# Patient Record
Sex: FEMALE | Race: WHITE | NOT HISPANIC OR LATINO | Employment: OTHER | URBAN - METROPOLITAN AREA
[De-identification: names, ages, dates, MRNs, and addresses within clinical notes are randomized per-mention and may not be internally consistent; named-entity substitution may affect disease eponyms.]

---

## 2018-09-19 ENCOUNTER — OFFICE VISIT (OUTPATIENT)
Dept: NEUROLOGY | Facility: CLINIC | Age: 28
End: 2018-09-19

## 2018-09-19 VITALS
SYSTOLIC BLOOD PRESSURE: 118 MMHG | HEART RATE: 97 BPM | DIASTOLIC BLOOD PRESSURE: 64 MMHG | OXYGEN SATURATION: 98 % | BODY MASS INDEX: 21.53 KG/M2 | WEIGHT: 134 LBS | HEIGHT: 66 IN

## 2018-09-19 DIAGNOSIS — G43.701 CHRONIC MIGRAINE WITHOUT AURA WITH STATUS MIGRAINOSUS, NOT INTRACTABLE: Primary | ICD-10-CM

## 2018-09-19 PROCEDURE — 99204 OFFICE O/P NEW MOD 45 MIN: CPT | Performed by: PSYCHIATRY & NEUROLOGY

## 2018-09-19 RX ORDER — ACETAMINOPHEN 500 MG
500 TABLET ORAL EVERY 6 HOURS PRN
COMMUNITY
End: 2020-11-10

## 2018-09-19 RX ORDER — BACLOFEN 10 MG/1
10 TABLET ORAL DAILY
COMMUNITY
Start: 2018-09-04 | End: 2021-04-30

## 2018-09-19 RX ORDER — TOPIRAMATE 50 MG/1
50 TABLET, FILM COATED ORAL 2 TIMES DAILY
Qty: 60 TABLET | Refills: 5 | Status: SHIPPED | OUTPATIENT
Start: 2018-09-19 | End: 2018-09-27

## 2018-09-19 RX ORDER — TOPIRAMATE 25 MG/1
TABLET ORAL
Qty: 42 TABLET | Refills: 0 | Status: SHIPPED | OUTPATIENT
Start: 2018-09-19 | End: 2018-09-27

## 2018-09-19 RX ORDER — DEXAMETHASONE 1.5 MG/1
1.5 TABLET ORAL 2 TIMES DAILY WITH MEALS
Qty: 4 TABLET | Refills: 0 | Status: SHIPPED | OUTPATIENT
Start: 2018-09-19 | End: 2018-09-21

## 2018-09-19 RX ORDER — DEXAMETHASONE 1 MG
0.5 TABLET ORAL 2 TIMES DAILY WITH MEALS
Qty: 2 TABLET | Refills: 0 | Status: SHIPPED | OUTPATIENT
Start: 2018-09-19 | End: 2018-09-21
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
## 2018-09-25 ENCOUNTER — HOSPITAL ENCOUNTER (OUTPATIENT)
Dept: MRI IMAGING | Facility: HOSPITAL | Age: 28
Discharge: HOME | End: 2018-09-25
Attending: PSYCHIATRY & NEUROLOGY | Admitting: PSYCHIATRY & NEUROLOGY

## 2018-09-25 DIAGNOSIS — G43.701 CHRONIC MIGRAINE WITHOUT AURA WITH STATUS MIGRAINOSUS, NOT INTRACTABLE: ICD-10-CM

## 2018-09-25 PROCEDURE — 0 GADOBENATE DIMEGLUMINE 529 MG/ML SOLUTION: Performed by: PSYCHIATRY & NEUROLOGY

## 2018-09-25 PROCEDURE — 70553 MRI BRAIN STEM W/O & W/DYE: CPT

## 2018-09-25 PROCEDURE — A9577 INJ MULTIHANCE: HCPCS | Performed by: PSYCHIATRY & NEUROLOGY

## 2018-09-25 RX ADMIN — GADOBENATE DIMEGLUMINE 12 ML: 529 INJECTION, SOLUTION INTRAVENOUS at 11:31

## 2018-09-26 ENCOUNTER — OFFICE VISIT (OUTPATIENT)
Dept: NEUROLOGY | Facility: CLINIC | Age: 28
End: 2018-09-26

## 2018-09-26 VITALS
HEART RATE: 84 BPM | WEIGHT: 134 LBS | BODY MASS INDEX: 21.53 KG/M2 | DIASTOLIC BLOOD PRESSURE: 68 MMHG | HEIGHT: 66 IN | SYSTOLIC BLOOD PRESSURE: 116 MMHG | OXYGEN SATURATION: 99 %

## 2018-09-26 DIAGNOSIS — G43.001 MIGRAINE WITHOUT AURA AND WITH STATUS MIGRAINOSUS, NOT INTRACTABLE: Primary | ICD-10-CM

## 2018-09-26 PROCEDURE — 64450 NJX AA&/STRD OTHER PN/BRANCH: CPT | Performed by: PSYCHIATRY & NEUROLOGY

## 2018-09-26 PROCEDURE — 99212 OFFICE O/P EST SF 10 MIN: CPT | Performed by: PSYCHIATRY & NEUROLOGY

## 2018-09-26 PROCEDURE — 64405 NJX AA&/STRD GR OCPL NRV: CPT | Performed by: PSYCHIATRY & NEUROLOGY

## 2018-09-26 RX ORDER — BUPIVACAINE HYDROCHLORIDE 5 MG/ML
10 INJECTION, SOLUTION PERINEURAL ONCE
Status: COMPLETED | OUTPATIENT
Start: 2018-09-26 | End: 2018-09-26

## 2018-09-26 RX ADMIN — BUPIVACAINE HYDROCHLORIDE 10 ML: 5 INJECTION, SOLUTION PERINEURAL at 14:53
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
## 2018-09-27 RX ORDER — TOPIRAMATE 100 MG/1
1 CAPSULE, EXTENDED RELEASE ORAL NIGHTLY
Qty: 30 CAPSULE | Refills: 5 | Status: SHIPPED | OUTPATIENT
Start: 2018-09-27 | End: 2019-02-18 | Stop reason: SDUPTHER

## 2018-10-16 ENCOUNTER — PRIOR AUTHORIZATION (OUTPATIENT)
Dept: NEUROLOGY | Facility: CLINIC | Age: 28
End: 2018-10-16

## 2018-10-16 NOTE — TELEPHONE ENCOUNTER
PA authorized for pt's trokendi. Upon calling the pharmacy to notify, pharmacist informed me it would still cost pt 500 dollars for a 30 day supply and 800 for a 90 day supply. I have faxed over a Trokendi coupon savings card for pt. RxBIN:[id number]         RxPCN:[employer]         RxGRP:[id number]         ID:[id number]  Fax was successful. Pt should have $0 copay.

## 2019-02-18 ENCOUNTER — TELEPHONE (OUTPATIENT)
Dept: NEUROLOGY | Facility: CLINIC | Age: 29
End: 2019-02-18

## 2019-02-18 RX ORDER — TOPIRAMATE 100 MG/1
1 CAPSULE, EXTENDED RELEASE ORAL NIGHTLY
Qty: 30 CAPSULE | Refills: 5 | Status: SHIPPED | OUTPATIENT
Start: 2019-02-18 | End: 2020-07-07

## 2019-02-19 ENCOUNTER — TELEPHONE (OUTPATIENT)
Dept: NEUROLOGY | Facility: CLINIC | Age: 29
End: 2019-02-19

## 2019-02-19 NOTE — TELEPHONE ENCOUNTER
Patient called and advised that she need brand name of Trokendi XR sent to pharmacy so that saving card will cover medicine.

## 2019-04-19 ENCOUNTER — TELEPHONE (OUTPATIENT)
Dept: NEUROLOGY | Facility: CLINIC | Age: 29
End: 2019-04-19

## 2019-04-19 NOTE — TELEPHONE ENCOUNTER
Patient called and advised that she wanted to see if you can review her MRi again as she went to  yesterday and they advised that they saw a possible Garett- malformation and she wanted to see if you see that as patient Aunt has this condition

## 2019-04-23 NOTE — TELEPHONE ENCOUNTER
Informed patient of Dr Abhijeet caceres and she stated that she understood and has no questions at this time

## 2019-04-23 NOTE — TELEPHONE ENCOUNTER
I reviewed her MRI again.  On 1 of her images I do see something however when I review  the other images I do not see it hence I think it may be an artifact.

## 2019-04-25 ENCOUNTER — OFFICE VISIT (OUTPATIENT)
Dept: NEUROLOGY | Facility: CLINIC | Age: 29
End: 2019-04-25

## 2019-04-25 VITALS
WEIGHT: 134 LBS | HEART RATE: 79 BPM | HEIGHT: 66 IN | DIASTOLIC BLOOD PRESSURE: 66 MMHG | OXYGEN SATURATION: 99 % | BODY MASS INDEX: 21.53 KG/M2 | SYSTOLIC BLOOD PRESSURE: 120 MMHG

## 2019-04-25 DIAGNOSIS — G43.001 MIGRAINE WITHOUT AURA AND WITH STATUS MIGRAINOSUS, NOT INTRACTABLE: Primary | ICD-10-CM

## 2019-04-25 PROCEDURE — 99215 OFFICE O/P EST HI 40 MIN: CPT | Performed by: PSYCHIATRY & NEUROLOGY

## 2019-04-25 RX ORDER — SUMATRIPTAN 50 MG/1
TABLET, FILM COATED ORAL
Qty: 8 TABLET | Refills: 3 | Status: SHIPPED | OUTPATIENT
Start: 2019-04-25 | End: 2019-10-09

## 2019-04-25 RX ORDER — PROMETHAZINE HYDROCHLORIDE 25 MG/1
25 TABLET ORAL EVERY 8 HOURS PRN
Qty: 60 TABLET | Refills: 0 | Status: SHIPPED | OUTPATIENT
Start: 2019-04-25 | End: 2021-04-30
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
## 2019-10-09 ENCOUNTER — TELEPHONE (OUTPATIENT)
Dept: NEUROLOGY | Facility: CLINIC | Age: 29
End: 2019-10-09

## 2019-10-09 RX ORDER — RIZATRIPTAN BENZOATE 10 MG/1
TABLET ORAL
Qty: 8 TABLET | Refills: 3 | Status: SHIPPED | OUTPATIENT
Start: 2019-10-09 | End: 2019-10-09 | Stop reason: SDUPTHER

## 2019-10-09 RX ORDER — RIZATRIPTAN BENZOATE 10 MG/1
TABLET ORAL
Qty: 8 TABLET | Refills: 0 | Status: SHIPPED | OUTPATIENT
Start: 2019-10-09 | End: 2019-10-22

## 2019-10-09 NOTE — TELEPHONE ENCOUNTER
Pt called and requested that something be called into pharmacy ([facility], [facility]) for her migraines. Pt stated that she is no longer taking Rx Imitrex and doesn't like taking due to causing nausea and is asking for something else to be prescribed. Please advise or send Rx order to requested pharmacy. Thanks.

## 2019-10-18 ENCOUNTER — TELEPHONE (OUTPATIENT)
Dept: NEUROLOGY | Facility: CLINIC | Age: 29
End: 2019-10-18

## 2019-10-18 NOTE — TELEPHONE ENCOUNTER
----- Message from Brittany Mohamud, [employer] sent at 10/18/2019  3:25 PM EDT -----  Contact: JOHN WILSON ()  Abhijeet    PT's , John, called/LVM states PT had a bad reaction to the last medication she was prescribed and if anything the medication made her feel worse. He's wondering if anything can be called in for relief.    Please call John back: [phone]

## 2019-10-21 NOTE — TELEPHONE ENCOUNTER
Called and spoke with patient. She stated that it was not the ajovy but it is wearing off quickly in the month.  She had a reaction to the maxalt. She stated that it made her head feel worse. And it made her nauseated. It made her feel much more worse.

## 2019-10-22 RX ORDER — ELETRIPTAN HYDROBROMIDE 40 MG/1
TABLET, FILM COATED ORAL
Qty: 8 TABLET | Refills: 3 | Status: SHIPPED | OUTPATIENT
Start: 2019-10-22 | End: 2020-07-07 | Stop reason: SDUPTHER

## 2019-10-22 NOTE — TELEPHONE ENCOUNTER
She can try a different triptan called Relpax.  I have sent the prescription in.  If she ever has a headache the Relpax needs to be taken at the onset of her headache.  She should not wait

## 2019-10-23 NOTE — TELEPHONE ENCOUNTER
Called and informed the patient.     Pt stated that she would like to have a massage on her head, neck and shoulders. She wants to know if you will write her a letter of medical necessity for it so that it will qualify for her health spending acct.  So she no longer pays out of pocket.

## 2019-10-24 NOTE — TELEPHONE ENCOUNTER
Pt called back and I informed her that, Per Dr. Jha, we do not prescribe this. Patient would need to check with her PCP. Pt acknowledges understanding.

## 2020-05-26 ENCOUNTER — TELEPHONE (OUTPATIENT)
Dept: NEUROLOGY | Facility: CLINIC | Age: 30
End: 2020-05-26

## 2020-05-26 RX ORDER — FREMANEZUMAB-VFRM 225 MG/1.5ML
INJECTION SUBCUTANEOUS
Refills: 10 | OUTPATIENT
Start: 2020-05-26

## 2020-05-26 NOTE — TELEPHONE ENCOUNTER
Spoke with patient, advised she needed appt for further refills. Scheduled patient for 07/07/20 and refilled medication to get her to that appt.

## 2020-05-26 NOTE — TELEPHONE ENCOUNTER
I had the opportunity to review the med list and/or validate the medication prescribed by the provider: YES  Medication: AJOVY  Dose: 225 MG  Sig:   How does the patient take the medication? INJECTION  How often does the patient take the medication? 1X PER MONTH  Pharmacy Type (local, mail order, specialty): LOCAL  Pharmacy Name: [email or web address]  Pharmacy Phone number or location (if available): [address] [facility] [phone]  Supply Amount (e.g, 30 or 90 days): 30 DAY    PT STATES SHE WAS DUE FOR INJECTION YESTERDAY.    LAST SEEN: 4-25-19    PLEASE CALL: [phone]; PLEASE LEAVE VM IF NO ANSWER.

## 2020-07-07 ENCOUNTER — TELEPHONE (OUTPATIENT)
Dept: NEUROLOGY | Facility: CLINIC | Age: 30
End: 2020-07-07

## 2020-07-07 ENCOUNTER — OFFICE VISIT (OUTPATIENT)
Dept: NEUROLOGY | Facility: CLINIC | Age: 30
End: 2020-07-07

## 2020-07-07 VITALS
BODY MASS INDEX: 22.02 KG/M2 | DIASTOLIC BLOOD PRESSURE: 78 MMHG | WEIGHT: 137 LBS | SYSTOLIC BLOOD PRESSURE: 120 MMHG | OXYGEN SATURATION: 99 % | HEIGHT: 66 IN | HEART RATE: 61 BPM

## 2020-07-07 DIAGNOSIS — G43.001 MIGRAINE WITHOUT AURA AND WITH STATUS MIGRAINOSUS, NOT INTRACTABLE: Primary | ICD-10-CM

## 2020-07-07 PROCEDURE — 99213 OFFICE O/P EST LOW 20 MIN: CPT | Performed by: PSYCHIATRY & NEUROLOGY

## 2020-07-07 RX ORDER — ELETRIPTAN HYDROBROMIDE 40 MG/1
TABLET, FILM COATED ORAL
Qty: 8 TABLET | Refills: 3 | Status: SHIPPED | OUTPATIENT
Start: 2020-07-07 | End: 2021-04-30

## 2020-08-12 ENCOUNTER — TELEPHONE (OUTPATIENT)
Dept: NEUROLOGY | Facility: CLINIC | Age: 30
End: 2020-08-12

## 2020-08-12 NOTE — TELEPHONE ENCOUNTER
Case number [id number] is in review for Botox approval and we will receive determination in 4-5 business days.

## 2020-08-12 NOTE — TELEPHONE ENCOUNTER
HECTOR WITH [facility] CALLING NEEDING ADDITIONAL INFORMATION FOR THE PA FOR THE BOTOX.  QUESTIONS TO BE ANSWERED:  1. THEY NEED TO KNOW DOES PT HAVE FEATURES OF MIGRAINE HEADACHE OF 8 DAYS PER MONTH.    2. KNOW IF TRIED AND FAILED 2 TYPES OF MEDICATIONS FOR MIGRAINE: IF ANTIDEPRESSANT,   ANTI ELPETIC, ANTIHYPERTENSION.    3. IF GOING TO BE USED FOR CGRP AGENT FOR MIGRAINE PROPHLAXTIC.    4 PRESCRIBED UNITS THAT WOULD BE INJECTED PER VISIT.      PLEASE CALL HER BACK WITH ANSWERS TO THESE QUESTIONS [phone]

## 2020-08-14 NOTE — TELEPHONE ENCOUNTER
I have put all this in my note stating that patient is still having 15 headache days a month and has tried several medications.  Could you please call back and let them know

## 2020-08-17 NOTE — TELEPHONE ENCOUNTER
Called [facility] back @ph# given, [phone] and spoke with Radha. There was not a reference #, but she pulled her up using member ID and address verification, however she could not locate where they had ever reached out to us about this so notified I would check with our Botox rep to see if she knows more about this and see if there is anything I can do to help here.

## 2020-08-18 ENCOUNTER — TELEPHONE (OUTPATIENT)
Dept: NEUROLOGY | Facility: CLINIC | Age: 30
End: 2020-08-18

## 2020-08-18 NOTE — TELEPHONE ENCOUNTER
----- Message from Raya Jha MD sent at 7/16/2020  4:50 PM EDT -----  Tee Quiñones,    I am stopping this patient's Ajovy as it is not working well and patient wants to go ahead and stop it.  Could you please try to get the Botox approved now.  Thanks      ----- Message -----  From: Kayla Capone CMA  Sent: 7/16/2020   3:39 PM EDT  To: Raya Jha MD    Pt is informed. She states that her Ajovy is not working as well, and she would like to stop it and try Botox with Topamax.    ----- Message -----  From: Raya Jha MD  Sent: 7/16/2020   1:34 PM EDT  To: Kayla Capone CMA    Could you please call the patient and let her know that insurance has denied her Botox.  The only way they will approve it is if she goes off Ajovy.  I do not think that she should do that however you can let me know what the patient thinks.      ----- Message -----  From: Nuria Smith MA  Sent: 7/16/2020  10:16 AM EDT  To: Raya Jha MD    I called eric and was told that you can call and do a peer to peer but the lady stated that the patient would need to stop the CGRP before they would approve Botox. The number to call is [phone]. Just let me know how I can help if you need me. Thanks  ----- Message -----  From: Raya Jha MD  Sent: 7/16/2020   9:17 AM EDT  To: Nuria Smith MA    Is there a way around this?  If the Botox works I am planning to stop the CGRP medication because it is not helping her much and patient is still having 15 headache days a month.  Also so many of my patients are on both Botox and CGRP because they have different mechanisms of action      ----- Message -----  From: Nuria Smith MA  Sent: 7/15/2020   3:37 PM EDT  To: Raya Jha MD    This patients insurance denied the botox due to her being on a CGRP. Please advise

## 2020-08-18 NOTE — TELEPHONE ENCOUNTER
Called Brianna and started a new auth with pending case number [id number] it can take up to 5 days. Faxed clinicals to [phone]

## 2020-10-26 ENCOUNTER — TELEPHONE (OUTPATIENT)
Dept: NEUROLOGY | Facility: CLINIC | Age: 30
End: 2020-10-26

## 2020-10-26 NOTE — TELEPHONE ENCOUNTER
PATIENT NEEDED REFILL SENT FOR AJOVY TO PHARMACY, SHE WAS SUPPOSED TO TAKE IT YESTERDAY BUT WE WERE CLOSED PLEASE ADVISE [facility] PLEASE ADVISE AND REFILL ONCE WE CAN THANKS SO MUCH.    PATIENT # [phone]

## 2020-10-27 NOTE — TELEPHONE ENCOUNTER
PT CALLED BACK TODAY REGARDING REQUEST FOR OBI. SHE STATES SHE NEEDS THE SHOT AS SHE LEAVES TO GO OUT OF TOWN TOMORROW. PLEASE REVIEW AND ADVISE      CALL BACK- [phone]

## 2021-01-15 ENCOUNTER — PROCEDURE VISIT (OUTPATIENT)
Dept: NEUROLOGY | Facility: CLINIC | Age: 31
End: 2021-01-15

## 2021-01-15 VITALS
OXYGEN SATURATION: 100 % | TEMPERATURE: 97.1 F | HEART RATE: 56 BPM | SYSTOLIC BLOOD PRESSURE: 108 MMHG | DIASTOLIC BLOOD PRESSURE: 78 MMHG

## 2021-01-15 DIAGNOSIS — G43.001 MIGRAINE WITHOUT AURA AND WITH STATUS MIGRAINOSUS, NOT INTRACTABLE: Primary | ICD-10-CM

## 2021-01-15 PROCEDURE — 64615 CHEMODENERV MUSC MIGRAINE: CPT | Performed by: PSYCHIATRY & NEUROLOGY

## 2021-01-15 PROCEDURE — 99211 OFF/OP EST MAY X REQ PHY/QHP: CPT | Performed by: PSYCHIATRY & NEUROLOGY

## 2021-01-15 NOTE — PROGRESS NOTES
Botox Procedure Note-    Current headache frequency: 25__ headaches days / month .     The risks and benefits were discussed with the patient. The patient was given the opportunity to ask questions. Informed consent form was signed.     Onabotulinumtoxin A was reconstituted with 0.9% normal saline. All injections sites were prepped with alcohol swab. Approximately 5 units of botox were injected into each of the following sites  as per routine migraine botox injection PREEMPT protocol:  (2 injections), procerus (1 injection), frontalis (4 injections), temporalis (8 injections), occipitalis (6 injections) cervical, upper cervical paraspinals (4 injections), and trapezius (6 injections) for a total of 31 sites.  The patient tolerated the procedure well. There were no immediate complications. The patient will follow up in approximately 12 weeks for her next injection.     Total amount of onabotulinumtoxin A injected was 155 units with 45 units wasted.     Additional notes:  Patient is here for her first Botox cycle.  Last month she has had near daily headaches.  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  She continues to take Ajovy shots but they have become less effective over time.  Tries not to take anything for abortive treatment but if her headache is too bad she takes Relpax although it does not help all the time.  I have given her samples of Ubrelvy 100 mg and Nurtec to try.  I will also give her an OB/GYN referral to start birth control pills which may help with her catamenial headaches.    Botox was patient supplied

## 2021-02-01 ENCOUNTER — OFFICE VISIT (OUTPATIENT)
Dept: OBSTETRICS AND GYNECOLOGY | Facility: CLINIC | Age: 31
End: 2021-02-01

## 2021-02-01 VITALS
SYSTOLIC BLOOD PRESSURE: 110 MMHG | BODY MASS INDEX: 21.69 KG/M2 | WEIGHT: 135 LBS | DIASTOLIC BLOOD PRESSURE: 70 MMHG | TEMPERATURE: 97.6 F | HEIGHT: 66 IN

## 2021-02-01 DIAGNOSIS — G43.001 MIGRAINE WITHOUT AURA AND WITH STATUS MIGRAINOSUS, NOT INTRACTABLE: Primary | ICD-10-CM

## 2021-02-01 DIAGNOSIS — Z00.00 ANNUAL PHYSICAL EXAM: Primary | ICD-10-CM

## 2021-02-01 PROCEDURE — 99385 PREV VISIT NEW AGE 18-39: CPT | Performed by: OBSTETRICS & GYNECOLOGY

## 2021-02-01 RX ORDER — LEVONORGESTREL AND ETHINYL ESTRADIOL 0.1-0.02MG
1 KIT ORAL DAILY
Qty: 28 TABLET | Refills: 12 | Status: SHIPPED | OUTPATIENT
Start: 2021-02-01 | End: 2021-02-22

## 2021-02-01 NOTE — TELEPHONE ENCOUNTER
Caller: SCOOBY WILSON     Relationship: SELF   Best call back number: [phone]    Medication needed:   Requested Prescriptions      No prescriptions requested or ordered in this encounter     AIMGOVY  ONCE A MONTH   When do you need the refill by: ASAP     What details did the patient provide when requesting the medication: PT STATES SHE WAS DUE THIS MED ON 01/25/2021 INSURANCE DENIED STATING THEY NEED PRE AUTH   PLEASE ADVISE     Does the patient have less than a 3 day supply:  [x] Yes  [] No    What is the patient's preferred pharmacy:    [facility]   LISTED

## 2021-02-01 NOTE — PROGRESS NOTES
GYN Annual Exam     CC - Here for annual exam.     Subjective   HPI  Chrystal Lawson is a 30 y.o. female, , who presents for annual well woman exam. Patient's last menstrual period was 01/10/2021 (approximate)..  Periods are regular and last 5-7 days.  Dysmenorrhea:mild, occurring premenstrually.  Patient reports problems with: none.  Partner Status: Marital Status: .  New Partners since last visit: no.  Desires STD Screening: no. The patient was told to see us to start birth control to help ease her migraines. She has migraines almost daily and has tried Botox without relief. She is not using birth control now. She is due for an annual.    Additional OB/GYN History   Current contraception: contraceptive methods: None  Desires to: start contraception  Last Pap :  negative  Last Completed Pap Smear       Status Date      PAP SMEAR No completions recorded        History of abnormal Pap smear: no  Family history of uterine, colon, breast, or ovarian cancer: no  Performs monthly Self-Breast Exam: yes  Exercises Regularly:no  Feelings of Anxiety or Depression: no  Tobacco Usage?: No   OB History        2    Para   2    Term   2            AB        Living   2       SAB        TAB        Ectopic        Molar        Multiple        Live Births   2                Health Maintenance   Topic Date Due   • Annual Gynecologic Pelvic and Breast Exam  1990   • ANNUAL PHYSICAL  1993   • TDAP/TD VACCINES (1 - Tdap) 2009   • HEPATITIS C SCREENING  2018   • PAP SMEAR  2018   • INFLUENZA VACCINE  2020   • Pneumococcal Vaccine 0-64  Aged Out   • MENINGOCOCCAL VACCINE  Aged Out       The additional following portions of the patient's history were reviewed and updated as appropriate: allergies, current medications, past family history, past medical history, past social history, past surgical history and problem list.    Review of Systems   Constitutional: Negative for  "activity change, appetite change, unexpected weight gain and unexpected weight loss.   Eyes: Negative for visual disturbance.   Respiratory: Negative for cough and shortness of breath.    Cardiovascular: Negative for chest pain and palpitations.   Gastrointestinal: Negative for abdominal distention, abdominal pain, nausea and vomiting.   Endocrine: Negative for cold intolerance, heat intolerance, polydipsia, polyphagia and polyuria.   Genitourinary: Negative for breast discharge, breast lump and breast pain.   Musculoskeletal: Negative for back pain.   Neurological: Positive for headache. Negative for light-headedness.   Psychiatric/Behavioral: Negative for behavioral problems.   All other systems reviewed and are negative.      I have reviewed and agree with the HPI, ROS, and historical information as entered above. Raúl Bourgeois MD    Objective   /70   Temp 97.6 °F (36.4 °C)   Ht 167.6 cm (66\")   Wt 61.2 kg (135 lb)   LMP 01/10/2021 (Approximate)   Breastfeeding No   BMI 21.79 kg/m²     Physical Exam  Vitals signs reviewed. Exam conducted with a chaperone present.   Constitutional:       Appearance: Normal appearance. She is normal weight.   HENT:      Head: Normocephalic and atraumatic.   Cardiovascular:      Rate and Rhythm: Normal rate and regular rhythm.   Pulmonary:      Effort: Pulmonary effort is normal.      Breath sounds: Normal breath sounds.   Chest:      Breasts:         Right: Normal.         Left: Normal.   Abdominal:      General: Abdomen is flat. Bowel sounds are normal.      Palpations: Abdomen is soft.   Genitourinary:     General: Normal vulva.      Vagina: Normal.      Cervix: Normal.      Uterus: Normal.       Adnexa: Right adnexa normal and left adnexa normal.      Rectum: Normal.   Skin:     General: Skin is warm and dry.   Neurological:      Mental Status: She is alert and oriented to person, place, and time.   Psychiatric:         Mood and Affect: Mood normal.         " Behavior: Behavior normal.         Assessment/Plan     Assessment     Problem List Items Addressed This Visit     None      Visit Diagnoses     Annual physical exam    -  Primary    Relevant Orders    Pap IG, HPV-hr          1. GYN annual well woman exam.   2. Migraine headaches without aura    Plan     1. OCP's/Vaginal Ring - Discussed side effects of nausea, BTB, headaches, breast tenderness and slight weight gain in the first three cycles.  Understands risks of blood clots, stroke, and theoretical risk of breast cancer.  Denies family history of blood clots.  2. Reviewed monthly self breast exams.  Instructed to call with lumps, pain, or breast discharge.    3. Reviewed exercise as a preventative health measures.   4. Reccommended Flu Vaccine in Fall of each year.  5. RTC in 1 year or PRN with problems      Raúl Bourgeois MD  02/01/2021

## 2021-02-01 NOTE — PATIENT INSTRUCTIONS
Preventive Care 21-39 Years Old, Female  Preventive care refers to visits with your health care provider and lifestyle choices that can promote health and wellness. This includes:  · A yearly physical exam. This may also be called an annual well check.  · Regular dental visits and eye exams.  · Immunizations.  · Screening for certain conditions.  · Healthy lifestyle choices, such as eating a healthy diet, getting regular exercise, not using drugs or products that contain nicotine and tobacco, and limiting alcohol use.  What can I expect for my preventive care visit?  Physical exam  Your health care provider will check your:  · Height and weight. This may be used to calculate body mass index (BMI), which tells if you are at a healthy weight.  · Heart rate and blood pressure.  · Skin for abnormal spots.  Counseling  Your health care provider may ask you questions about your:  · Alcohol, tobacco, and drug use.  · Emotional well-being.  · Home and relationship well-being.  · Sexual activity.  · Eating habits.  · Work and work environment.  · Method of birth control.  · Menstrual cycle.  · Pregnancy history.  What immunizations do I need?    Influenza (flu) vaccine  · This is recommended every year.  Tetanus, diphtheria, and pertussis (Tdap) vaccine  · You may need a Td booster every 10 years.  Varicella (chickenpox) vaccine  · You may need this if you have not been vaccinated.  Human papillomavirus (HPV) vaccine  · If recommended by your health care provider, you may need three doses over 6 months.  Measles, mumps, and rubella (MMR) vaccine  · You may need at least one dose of MMR. You may also need a second dose.  Meningococcal conjugate (MenACWY) vaccine  · One dose is recommended if you are age 19-21 years and a first-year college student living in a residence [place], or if you have one of several medical conditions. You may also need additional booster doses.  Pneumococcal conjugate (PCV13) vaccine  · You may need  this if you have certain conditions and were not previously vaccinated.  Pneumococcal polysaccharide (PPSV23) vaccine  · You may need one or two doses if you smoke cigarettes or if you have certain conditions.  Hepatitis A vaccine  · You may need this if you have certain conditions or if you travel or work in places where you may be exposed to hepatitis A.  Hepatitis B vaccine  · You may need this if you have certain conditions or if you travel or work in places where you may be exposed to hepatitis B.  Haemophilus influenzae type b (Hib) vaccine  · You may need this if you have certain conditions.  You may receive vaccines as individual doses or as more than one vaccine together in one shot (combination vaccines). Talk with your health care provider about the risks and benefits of combination vaccines.  What tests do I need?    Blood tests  · Lipid and cholesterol levels. These may be checked every 5 years starting at age 20.  · Hepatitis C test.  · Hepatitis B test.  Screening  · Diabetes screening. This is done by checking your blood sugar (glucose) after you have not eaten for a while (fasting).  · Sexually transmitted disease (STD) testing.  · BRCA-related cancer screening. This may be done if you have a family history of breast, ovarian, tubal, or peritoneal cancers.  · Pelvic exam and Pap test. This may be done every 3 years starting at age 21. Starting at age 30, this may be done every 5 years if you have a Pap test in combination with an HPV test.  Talk with your health care provider about your test results, treatment options, and if necessary, the need for more tests.  Follow these instructions at home:  Eating and drinking    · Eat a diet that includes fresh fruits and vegetables, whole grains, lean protein, and low-fat dairy.  · Take vitamin and mineral supplements as recommended by your health care provider.  · Do not drink alcohol if:  ? Your health care provider tells you not to drink.  ? You are  pregnant, may be pregnant, or are planning to become pregnant.  · If you drink alcohol:  ? Limit how much you have to 0-1 drink a day.  ? Be aware of how much alcohol is in your drink. In the [place] one drink equals one 12 oz bottle of beer (355 mL), one 5 oz glass of wine (148 mL), or one 1½ oz glass of hard liquor (44 mL).  Lifestyle  · Take daily care of your teeth and gums.  · Stay active. Exercise for at least 30 minutes on 5 or more days each week.  · Do not use any products that contain nicotine or tobacco, such as cigarettes, e-cigarettes, and chewing tobacco. If you need help quitting, ask your health care provider.  · If you are sexually active, practice safe sex. Use a condom or other form of birth control (contraception) in order to prevent pregnancy and STIs (sexually transmitted infections). If you plan to become pregnant, see your health care provider for a preconception visit.  What's next?  · Visit your health care provider once a year for a well check visit.  · Ask your health care provider how often you should have your eyes and teeth checked.  · Stay up to date on all vaccines.  This information is not intended to replace advice given to you by your health care provider. Make sure you discuss any questions you have with your health care provider.  Document Revised: 08/29/2019 Document Reviewed: 08/29/2019  [employer] Patient Education © 2020 [employer]

## 2021-02-17 DIAGNOSIS — Z00.00 ANNUAL PHYSICAL EXAM: ICD-10-CM

## 2021-02-18 ENCOUNTER — TELEPHONE (OUTPATIENT)
Dept: OBSTETRICS AND GYNECOLOGY | Facility: CLINIC | Age: 31
End: 2021-02-18

## 2021-02-22 ENCOUNTER — TELEPHONE (OUTPATIENT)
Dept: OBSTETRICS AND GYNECOLOGY | Facility: CLINIC | Age: 31
End: 2021-02-22

## 2021-02-22 ENCOUNTER — TELEPHONE (OUTPATIENT)
Dept: NEUROLOGY | Facility: CLINIC | Age: 31
End: 2021-02-22

## 2021-02-22 ENCOUNTER — DOCUMENTATION (OUTPATIENT)
Dept: NEUROLOGY | Facility: HOSPITAL | Age: 31
End: 2021-02-22

## 2021-02-22 RX ORDER — SUMATRIPTAN 6 MG/.5ML
INJECTION, SOLUTION SUBCUTANEOUS
Qty: 1 ML | Refills: 3 | Status: SHIPPED | OUTPATIENT
Start: 2021-02-22 | End: 2021-04-30

## 2021-02-22 NOTE — TELEPHONE ENCOUNTER
Provider:   Caller: SCOOBY  Relationship to Patient: SELF  Phone Number: [phone]  Pharmacy: [place] [address]    Reason for Call: PT STATES SHE WENT TO THE OBGYN LIKE SUGGESTED (NOTES IN Epic) BUT SINCE STARTING THE BIRTH CONTROL levonorgestrel-ethinyl estradiol (AVIANE,ALESSE,LESSINA) 0.1-20 MG-MCG per tablet ON 2-14-21 SHE HAS HAD A MIGRAINE EVERYDAY SINCE , SHE STATES 8/10 SCALE. SHE ALSO STATES THAT THE SAMPLES OF Ubrelvy 100 mg and Nurtec DID NOT HELP WHAT SO EVER. SHE STATES SHE HAS MISSED 3 DAYS OF WORK D/T THE CONSTANT MIGRAINE, SHE WAS WONDERING IF ANYTHING COULD BE SENT BEFORE HER AJOVY ON 2-25-21, PLEASE ADVISE.     When was the patient last seen: 1-15-21    When did it start: 2-14-21    Where is it located: HEAD, ALL OVER, NECK SHOULDERS, HER JOINTS    Characteristics of symptom/severity: ACHY,AND VERY NAUSEOUS     Timing- Is it constant or intermittent: CONSTANT    What makes it worse: LOUD NOISES, LIGHT    What makes it better: DARKNESS    What therapies/medications have you tried: NORMAL MEDICATIONS .

## 2021-02-23 ENCOUNTER — TELEPHONE (OUTPATIENT)
Dept: NEUROLOGY | Facility: CLINIC | Age: 31
End: 2021-02-23

## 2021-02-23 RX ORDER — METHYLPREDNISOLONE 4 MG/1
TABLET ORAL
Qty: 1 EACH | Refills: 0 | Status: SHIPPED | OUTPATIENT
Start: 2021-02-23 | End: 2021-04-19 | Stop reason: SDUPTHER

## 2021-02-23 NOTE — TELEPHONE ENCOUNTER
Please advise.     Patient formed instant allergic reaction to the sumatriptan injection that was prescribed.

## 2021-02-23 NOTE — TELEPHONE ENCOUNTER
Caller: PT     Relationship: SELF    Best call back number: [phone]    What medications are you currently taking:   Current Outpatient Medications on File Prior to Visit   Medication Sig Dispense Refill   • baclofen (LIORESAL) 10 MG tablet Take 10 mg by mouth Daily.     • eletriptan (RELPAX) 40 MG tablet Take 1 tablet at onset of migraine.  May repeat once in 2 hours.  Do not take over 2 tablets a day or 8 tablets a month 8 tablet 3   • Fremanezumab-vfrm 225 MG/1.5ML solution prefilled syringe Inject 1.5 mL under the skin into the appropriate area as directed Every 30 (Thirty) Days. 1.5 mL 11   • OnabotulinumtoxinA 200 units reconstituted solution Inject 200 units IM into head neck shoulders every 12 weeks per FDA protocol Dx G43.701 1 each 3   • promethazine (PHENERGAN) 25 MG tablet Take 1 tablet by mouth Every 8 (Eight) Hours As Needed for Nausea or Vomiting. 60 tablet 0     No current facility-administered medications on file prior to visit.         When did you start taking these medications: 2/22/21    Which medication are you concerned about: SUMATRIPTAN    Who prescribed you this medication:     What are your concerns: PT STATED SHE HAD AN ALLERGIC REACTION AND THAT SHE CALLED THE EMERGENCY LINE AND SPOKE WITH SOMEONE AND THEY TOLD HER THAT  WOULD SPEAK WITH HER TODAY. SHE ALSO STATED THAT SHE CAN NOT GET HER AJOVY INJECTION UNTIL THE 25TH. THE EMERGENCY DOCTOR TOLD HER NO MORE TRIPTAN MEDICATIONS. SHE STATED SHE NEEDS SOMETHING TODAY SO THAT SHE CAN GO TO WORK THIS WEEK.    How long have you been taking these medications: 1 DAY AND IT WAS INSTANT REACTION

## 2021-02-23 NOTE — TELEPHONE ENCOUNTER
Tell the patient I want her to take a steroid taper pack.  I have called in the prescription to her pharmacy.  That will help her allergic reaction as well as her headaches

## 2021-02-24 RX ORDER — ACETAMINOPHEN AND CODEINE PHOSPHATE 120; 12 MG/5ML; MG/5ML
1 SOLUTION ORAL DAILY
Qty: 28 TABLET | Refills: 12 | Status: SHIPPED | OUTPATIENT
Start: 2021-02-24 | End: 2021-05-18

## 2021-04-08 ENCOUNTER — TELEPHONE (OUTPATIENT)
Dept: NEUROLOGY | Facility: CLINIC | Age: 31
End: 2021-04-08

## 2021-04-09 NOTE — TELEPHONE ENCOUNTER
Provider:   Caller: [facility]    Reason for Call: STATES THEY DID NOT RECEIVE A INSURANCE CARD FOR PATIENT.

## 2021-04-19 ENCOUNTER — TELEPHONE (OUTPATIENT)
Dept: NEUROLOGY | Facility: CLINIC | Age: 31
End: 2021-04-19

## 2021-04-19 DIAGNOSIS — G43.001 MIGRAINE WITHOUT AURA AND WITH STATUS MIGRAINOSUS, NOT INTRACTABLE: Primary | ICD-10-CM

## 2021-04-19 RX ORDER — METHYLPREDNISOLONE 4 MG/1
TABLET ORAL
Qty: 1 EACH | Refills: 0 | Status: CANCELLED | OUTPATIENT
Start: 2021-04-19

## 2021-04-19 RX ORDER — METHYLPREDNISOLONE 4 MG/1
TABLET ORAL
Qty: 1 EACH | Refills: 0 | Status: SHIPPED | OUTPATIENT
Start: 2021-04-19 | End: 2021-04-30

## 2021-04-19 NOTE — TELEPHONE ENCOUNTER
Spoke to patient and informed her Dr. Jha sent in the Medrol Dose nirmala and reminded her on her 2nd Botox appointment.

## 2021-04-19 NOTE — TELEPHONE ENCOUNTER
Caller: Chrystal Lawson    Relationship: Self    Best call back number: [phone]  Medication needed:   Requested Prescriptions      No prescriptions requested or ordered in this encounter     methylPREDNISolone (MEDROL) 4 MG dose pack    When do you need the refill by: ASAP    What additional details did the patient provide when requesting the medication: PT STATES SHE HAS BEEN HAVING MAJOR MIGRAINES.     Does the patient have less than a 3 day supply:  [x] Yes  [] No    What is the patient's preferred pharmacy:     [facility]

## 2021-04-19 NOTE — TELEPHONE ENCOUNTER
Please let the patient know that I have sent in the Medrol dose pack.  Also let her know that she has an appointment with me at the end of the month on 30th for her second Botox

## 2021-04-22 ENCOUNTER — TELEPHONE (OUTPATIENT)
Dept: NEUROLOGY | Facility: CLINIC | Age: 31
End: 2021-04-22

## 2021-04-22 NOTE — TELEPHONE ENCOUNTER
Provider: JAVED    Caller: SCOOBY WILSON    Relationship to Patient: SELF    Pharmacy: [facility] - # [phone]    Phone Number: [phone]    Reason for Call: THE PATIENT JUST WANTED TO CALL TO UPDATE THE SPECIALTY PHARMACY FOR HER BOTOX.     PLEASE ADVISE.

## 2021-04-30 ENCOUNTER — PROCEDURE VISIT (OUTPATIENT)
Dept: NEUROLOGY | Facility: CLINIC | Age: 31
End: 2021-04-30

## 2021-04-30 VITALS
SYSTOLIC BLOOD PRESSURE: 118 MMHG | BODY MASS INDEX: 21.62 KG/M2 | WEIGHT: 134.5 LBS | TEMPERATURE: 97.5 F | DIASTOLIC BLOOD PRESSURE: 72 MMHG | OXYGEN SATURATION: 99 % | HEART RATE: 69 BPM | HEIGHT: 66 IN

## 2021-04-30 DIAGNOSIS — G43.001 MIGRAINE WITHOUT AURA AND WITH STATUS MIGRAINOSUS, NOT INTRACTABLE: Primary | ICD-10-CM

## 2021-04-30 PROCEDURE — 64615 CHEMODENERV MUSC MIGRAINE: CPT | Performed by: PSYCHIATRY & NEUROLOGY

## 2021-04-30 RX ORDER — METHYLPREDNISOLONE 4 MG/1
TABLET ORAL
Qty: 1 EACH | Refills: 0 | Status: SHIPPED | OUTPATIENT
Start: 2021-04-30 | End: 2021-07-09 | Stop reason: SDUPTHER

## 2021-04-30 NOTE — PROGRESS NOTES
Botox Procedure Note-     Current headache frequency: 10-15__ headaches days / month .     The risks and benefits were discussed with the patient. The patient was given the opportunity to ask questions. Informed consent form was signed.     Onabotulinumtoxin A was reconstituted with 0.9% normal saline. All injections sites were prepped with alcohol swab. Approximately 5 units of botox were injected into each of the following sites  as per routine migraine botox injection PREEMPT protocol:  (2 injections), procerus (1 injection), frontalis (4 injections), temporalis (8 injections), occipitalis (6 injections) cervical, upper cervical paraspinals (4 injections), and trapezius (6 injections) for a total of 31 sites.  The patient tolerated the procedure well. There were no immediate complications. The patient will follow up in approximately 12 weeks for her next injection.     Total amount of onabotulinumtoxin A injected was 155 units with 45 units wasted.     Additional notes:   Patient states that after her first cycle of Botox last time she had a week long headache but gradually the headache started to improve.  She has been taking monthly Ajovy shots but by the third week the headaches start coming back.  With regards to her abortive treatment she tried both Ubrelvy and Nurtec and they were both ineffective.  She also tried Imitrex subcu for a week long headache however that caused her to have side effects.  She does not want to take triptans again as she is allergic to most of them.  The only thing that helps is a Medrol Dosepak.  She also saw OB/GYN and has started a new birth control a few days back to help with her catamenial headaches.     Botox was patient supplied

## 2021-05-18 ENCOUNTER — OFFICE VISIT (OUTPATIENT)
Dept: OBSTETRICS AND GYNECOLOGY | Facility: CLINIC | Age: 31
End: 2021-05-18

## 2021-05-18 VITALS — SYSTOLIC BLOOD PRESSURE: 122 MMHG | WEIGHT: 136.6 LBS | BODY MASS INDEX: 22.05 KG/M2 | DIASTOLIC BLOOD PRESSURE: 80 MMHG

## 2021-05-18 DIAGNOSIS — T19.2XXA FOREIGN BODY IN VAGINA, INITIAL ENCOUNTER: ICD-10-CM

## 2021-05-18 DIAGNOSIS — R39.15 URINARY URGENCY: Primary | ICD-10-CM

## 2021-05-18 DIAGNOSIS — N89.8 VAGINAL DISCHARGE: ICD-10-CM

## 2021-05-18 LAB
BILIRUB BLD-MCNC: NEGATIVE MG/DL
CLARITY, POC: CLEAR
COLOR UR: YELLOW
GLUCOSE UR STRIP-MCNC: NEGATIVE MG/DL
KETONES UR QL: NEGATIVE
KOH PREP NAIL: NORMAL
LEUKOCYTE EST, POC: NEGATIVE
NITRITE UR-MCNC: NEGATIVE MG/ML
PH UR: 6 [PH] (ref 5–8)
PROT UR STRIP-MCNC: NEGATIVE MG/DL
RBC # UR STRIP: NEGATIVE /UL
SP GR UR: 1.02 (ref 1–1.03)
UROBILINOGEN UR QL: NORMAL
WET PREP GENITAL: POSITIVE

## 2021-05-18 PROCEDURE — 87210 SMEAR WET MOUNT SALINE/INK: CPT | Performed by: NURSE PRACTITIONER

## 2021-05-18 PROCEDURE — 81003 URINALYSIS AUTO W/O SCOPE: CPT | Performed by: NURSE PRACTITIONER

## 2021-05-18 PROCEDURE — 99213 OFFICE O/P EST LOW 20 MIN: CPT | Performed by: NURSE PRACTITIONER

## 2021-05-18 PROCEDURE — 87220 TISSUE EXAM FOR FUNGI: CPT | Performed by: NURSE PRACTITIONER

## 2021-05-18 RX ORDER — FLUCONAZOLE 150 MG/1
TABLET ORAL
Qty: 2 TABLET | Refills: 0 | Status: SHIPPED | OUTPATIENT
Start: 2021-05-18 | End: 2022-04-19

## 2021-05-18 RX ORDER — METRONIDAZOLE 500 MG/1
500 TABLET ORAL 2 TIMES DAILY
Qty: 14 TABLET | Refills: 0 | Status: SHIPPED | OUTPATIENT
Start: 2021-05-18 | End: 2021-05-25

## 2021-05-18 NOTE — PROGRESS NOTES
"Chief Complaint   Patient presents with   • Follow-up     possible UTI       Subjective   HPI  Chrystal Lawson is a 31 y.o. female, , who presents for possible UTI.  She states she has had a very foul vaginal odor for the past few weeks.     She states she has experienced this problem for 6 days.  She describes the severity as mild.  She states that the problem is stable.  The patient reports additional symptoms as urinary urgency, burning with urination, discomfort, increased body odor, and \"head feels weird\".      Patient reports about 6 days ago she began having UTI symptoms such as urinary urgency, burning with urination and discomfort. She states she tried to take AZO and it has helped, has been taking it for 4 days. She also states that she stopped taking her BC on 21 due to increased body and vaginal odor and her head feeling weird. She then started spotting on 05/15/21.    Her last LMP was Patient's last menstrual period was 05/15/2021..  Periods are regular every 28-30 days, lasting 5 days.  Dysmenorrhea:mild, occurring premenstrually and throughout menses.  Partner Status: Marital Status: .  New Partners since last visit: no.  Desires STD Screening: no.    Additional OB/GYN History   Current contraception: contraceptive methods: Vasectomy   Desires to: do not start contraception  Last Pap :   Last Completed Pap Smear          PAP SMEAR (Every 3 Years) Next due on 2/3/2024    2021  Pap IG, HPV-hr              History of abnormal Pap smear: yes - 21- HPV negative with ASCUS  Last mammogram:   Last Completed Mammogram     This patient has no relevant Health Maintenance data.        Tobacco Usage?: No   OB History        2    Para   2    Term   2            AB        Living   2       SAB        TAB        Ectopic        Molar        Multiple        Live Births   2                Health Maintenance   Topic Date Due   • Annual Gynecologic Pelvic and Breast Exam  " Never done   • COVID-19 Vaccine (1) Never done   • TDAP/TD VACCINES (1 - Tdap) Never done   • HEPATITIS C SCREENING  Never done   • INFLUENZA VACCINE  08/01/2021   • ANNUAL PHYSICAL  02/02/2022   • PAP SMEAR  02/03/2024   • Pneumococcal Vaccine 0-64  Aged Out       The additional following portions of the patient's history were reviewed and updated as appropriate: allergies, current medications, past family history, past medical history, past social history and past surgical history.    Review of Systems   Constitutional: Negative.    Cardiovascular: Negative.    Gastrointestinal: Negative.         Discomfort   Endocrine: Negative.         Burning with urination    Genitourinary: Positive for urgency.   Skin:        Increased body odor   Neurological: Negative.    Psychiatric/Behavioral: Negative.        I have reviewed and agree with the HPI, ROS, and historical information as entered above. Daphnie Virk, APRN    Objective   /80   Wt 62 kg (136 lb 9.6 oz)   LMP 05/15/2021   BMI 22.05 kg/m²     Physical Exam  Exam conducted with a chaperone present.   Constitutional:       Appearance: Normal appearance. She is normal weight.   Cardiovascular:      Rate and Rhythm: Normal rate and regular rhythm.   Abdominal:      Palpations: Abdomen is soft.   Genitourinary:     General: Normal vulva.      Vagina: Foreign body ( RETAINED TAMPON NOTED BEHIND UTERUS ON EXAM) present. Vaginal discharge present.      Cervix: Normal.      Uterus: Normal.       Adnexa: Right adnexa normal and left adnexa normal.      Rectum: Normal.      Comments: Retained tampon behind uterus felt on exam and removed  Neurological:      Mental Status: She is alert.         Assessment/Plan     Assessment     Problem List Items Addressed This Visit     None      Visit Diagnoses     Urinary urgency    -  Primary    Relevant Orders    POC Urinalysis Dipstick, Automated (Completed)    Foreign body in vagina, initial encounter        Relevant  Medications    metroNIDAZOLE (Flagyl) 500 MG tablet    fluconazole (Diflucan) 150 MG tablet          1.  Retained tampon removed on exam  2. + clue cells on wet prep  3.  CCUA negative    Plan     Retained tampon present probably since last period 4 weeks ago. Tampon removed today on exam.   2.   Will do sitz baths next several days with warm water  3.   + clue cells on wet prep. Rx flagyl and diflucan as directed.       Daphnie Virk, APRN  05/18/2021

## 2021-07-09 ENCOUNTER — TELEPHONE (OUTPATIENT)
Dept: NEUROLOGY | Facility: CLINIC | Age: 31
End: 2021-07-09

## 2021-07-09 RX ORDER — METHYLPREDNISOLONE 4 MG/1
TABLET ORAL
Qty: 1 EACH | Refills: 0 | Status: SHIPPED | OUTPATIENT
Start: 2021-07-09 | End: 2021-08-09

## 2021-07-09 RX ORDER — METHYLPREDNISOLONE 4 MG/1
TABLET ORAL
Qty: 21 EACH | Refills: 0 | Status: SHIPPED | OUTPATIENT
Start: 2021-07-09 | End: 2021-08-09

## 2021-07-09 NOTE — TELEPHONE ENCOUNTER
Provider: DR. BURT    Caller: KIMMY    Relationship to Patient:     Pharmacy: [facility] [address] [facility]    Phone Number: [phone]    Reason for Call: LOT OF PAIN RELATED TO MIGRAINE    When was the patient last seen: 4-30-21    When did it start: 7-7-21    PT SENT IN REFILL REQUEST THROUGH [facility]. [facility] DID NOT HAVE REFILL FOR MEDROL PACK. PLEASE

## 2021-08-09 RX ORDER — METHYLPREDNISOLONE 4 MG/1
TABLET ORAL
Qty: 21 EACH | Refills: 0 | Status: SHIPPED | OUTPATIENT
Start: 2021-08-09 | End: 2021-10-21 | Stop reason: SDUPTHER

## 2021-10-21 RX ORDER — METHYLPREDNISOLONE 4 MG/1
TABLET ORAL
Qty: 21 EACH | Refills: 0 | Status: SHIPPED | OUTPATIENT
Start: 2021-10-21 | End: 2022-04-19

## 2021-10-21 NOTE — TELEPHONE ENCOUNTER
Please asked the patient why she missed her last Botox?  If she is having frequent headaches she should continue getting it.  I will send in a prescription of a Medrol Dosepak this time but taking too many Medrol Dosepaks will not help

## 2021-10-21 NOTE — TELEPHONE ENCOUNTER
Caller: Chrystal Lawson    Relationship: Self      Medication requested (name and dosage)    Requested Prescriptions:   Requested Prescriptions     Pending Prescriptions Disp Refills   • methylPREDNISolone (MEDROL) 4 MG dose pack 21 each 0     Sig: TAKE BY MOUTH AS INSTRUCTED - PER PACKAGE INSTRUCTIONS        Pharmacy where request should be sent: [facility]     Additional details provided by patient: PT IS OUT OF THIS MEDICATION     Best call back number:[phone]    Does the patient have less than a 3 day supply:  [x] Yes  [] No    Mirela Nolen Rep   10/21/21 13:14 EDT

## 2021-10-22 NOTE — TELEPHONE ENCOUNTER
Pt stated that she did not feel like the Botox was helping her migraines any, and would make her migraines worse every time she got Botox injections.

## 2021-12-28 ENCOUNTER — TELEPHONE (OUTPATIENT)
Dept: NEUROLOGY | Facility: CLINIC | Age: 31
End: 2021-12-28

## 2021-12-28 RX ORDER — METHYLPREDNISOLONE 4 MG/1
TABLET ORAL
Qty: 21 EACH | Refills: 0 | OUTPATIENT
Start: 2021-12-28

## 2021-12-28 NOTE — TELEPHONE ENCOUNTER
Per Dr. King message in October in regards to the refill. Pt needed an apt. Apt has not been made/kept.

## 2021-12-28 NOTE — TELEPHONE ENCOUNTER
Caller:   SCOOBY  Relationship: SELF    Best call back number: [phone]  Requested Prescriptions:   Requested Prescriptions     Pending Prescriptions Disp Refills   • methylPREDNISolone (MEDROL) 4 MG dose pack 21 each 0     Sig: TAKE BY MOUTH AS INSTRUCTED - PER PACKAGE INSTRUCTIONS        Pharmacy where request should be sent:  [facility]   Additional details provided by patient: NO APPT MADE IN FUTURE. PT IS OUT OF MEDICATION     Does the patient have less than a 3 day supply:  [x] Yes  [] No    Mirela Washburn Rep   12/28/21 10:17 EST

## 2021-12-28 NOTE — TELEPHONE ENCOUNTER
Caller: SCOOBY WILSON    Relationship to patient: SELF        Chief complaint: PT IS SICK AND ALSO HAS A HEADACHE    Type of visit: 2- F/U, PT WANTS TO DO THIS APPT AS A [employer] VIDEO?      Additional notes:PT AND FAMILY IS SICK AT THIS TIME AND SHE IS ALSO HEADACHE FOR ALMOST 3 WEEKS. SHE IS NEEDING THE STEROID PACK FOR THE HEADACHE(BREAKS HER OUT OF A BAD CYCLE).      PLEASE CALL HER BACK [phone]

## 2021-12-29 ENCOUNTER — TELEPHONE (OUTPATIENT)
Dept: NEUROLOGY | Facility: OTHER | Age: 31
End: 2021-12-29

## 2021-12-29 NOTE — TELEPHONE ENCOUNTER
Patients  called to request all neuro medical records. I was able to transfer to [facility] for that request.

## 2021-12-29 NOTE — TELEPHONE ENCOUNTER
Patient informed it would not be appropriate to take another steroid. Last time she seen Dr. Jha was April 2021, and was advised to keep appointment to discuss other treatment options. Patient explained she has tried many abortives and preventive in the past, however nothing has helped. She was urged to visit her nearest ED if necessary.   -TMT

## 2021-12-29 NOTE — TELEPHONE ENCOUNTER
PT'S  CALLED BACK BECAUSE WHEN TRANSFERRED TO [facility] WAS REDIRECTED TO OUT QUEUE. ADVISE  [phone] FOR [facility] AFUA

## 2021-12-29 NOTE — TELEPHONE ENCOUNTER
Unfortunately, I would not recommend filling it as it looks as though she has taken this several times over the last few months, which can cause more harm.

## 2022-03-28 NOTE — TELEPHONE ENCOUNTER
Prednisone called into pharmacy to take with Benadryl per protocol for pre-medication:    Prednisone 30mg PO Q8hrs for 3 doses in the 24 hrs before exam.  2. Benadryl 25mg PO q12 hrs for 2 doises 24 hours prior to exam   This is the request for the medrol dose pack that I already sent you an encounter on.

## 2022-04-19 ENCOUNTER — OFFICE VISIT (OUTPATIENT)
Dept: OBSTETRICS AND GYNECOLOGY | Facility: CLINIC | Age: 32
End: 2022-04-19

## 2022-04-19 VITALS
HEIGHT: 66 IN | SYSTOLIC BLOOD PRESSURE: 104 MMHG | WEIGHT: 132.4 LBS | BODY MASS INDEX: 21.28 KG/M2 | DIASTOLIC BLOOD PRESSURE: 76 MMHG

## 2022-04-19 DIAGNOSIS — R30.0 BURNING WITH URINATION: ICD-10-CM

## 2022-04-19 DIAGNOSIS — Z01.419 WELL WOMAN EXAM WITH ROUTINE GYNECOLOGICAL EXAM: Primary | ICD-10-CM

## 2022-04-19 LAB
BILIRUB BLD-MCNC: NEGATIVE MG/DL
CLARITY, POC: CLEAR
COLOR UR: NORMAL
GLUCOSE UR STRIP-MCNC: NEGATIVE MG/DL
KETONES UR QL: NEGATIVE
LEUKOCYTE EST, POC: NEGATIVE
NITRITE UR-MCNC: NEGATIVE MG/ML
PH UR: 6 [PH] (ref 5–8)
PROT UR STRIP-MCNC: NEGATIVE MG/DL
RBC # UR STRIP: NEGATIVE /UL
SP GR UR: 1.01 (ref 1–1.03)
UROBILINOGEN UR QL: NORMAL

## 2022-04-19 PROCEDURE — 81002 URINALYSIS NONAUTO W/O SCOPE: CPT | Performed by: NURSE PRACTITIONER

## 2022-04-19 PROCEDURE — 99395 PREV VISIT EST AGE 18-39: CPT | Performed by: NURSE PRACTITIONER

## 2022-04-19 RX ORDER — KETOCONAZOLE 20 MG/G
CREAM TOPICAL
COMMUNITY
Start: 2022-02-14 | End: 2022-04-19

## 2022-04-19 RX ORDER — ERENUMAB-AOOE 70 MG/ML
INJECTION SUBCUTANEOUS
COMMUNITY
Start: 2022-02-14 | End: 2022-04-19

## 2022-04-19 RX ORDER — DIVALPROEX SODIUM 500 MG/1
TABLET, EXTENDED RELEASE ORAL
COMMUNITY
Start: 2022-02-14 | End: 2022-04-19

## 2022-04-19 RX ORDER — RIMEGEPANT SULFATE 75 MG/75MG
TABLET, ORALLY DISINTEGRATING ORAL
COMMUNITY
Start: 2022-01-18 | End: 2022-04-19

## 2022-04-19 RX ORDER — PHENAZOPYRIDINE HYDROCHLORIDE 100 MG/1
TABLET, FILM COATED ORAL
COMMUNITY
Start: 2022-01-21 | End: 2022-04-19

## 2022-04-19 NOTE — PROGRESS NOTES
Gynecologic Exam Note      Chief Complaint   Patient presents with   • Gynecologic Exam     annual       Subjective   HPI  Chrystal Lawson is a 32 y.o. female, , who presents for annual and evaluation for UTI and/ or vaginal infection. Pt states that on Friday burning and constant urge to urinate. Pt states that she took AZO and that got better but then started to have lower abdominal pain/ cramping on  and still some discomfort with urination. Pt also states that she has had a retained tampon before and always gets nervous about that as well.    She states she has experienced this problem for 5 days.  She describes the severity as mild.  The patient reports additional symptoms as none.      Her last LMP was Patient's last menstrual period was 2022..  Periods are regular every 28-30 days, lasting 5-6 days.  Dysmenorrhea:mild, occurring premenstrually and first 1-2 days of flow.   Partner Status: Marital Status: .  New Partners since last visit: no.  Desires STD Screening: no.    Additional OB/GYN History   Current contraception: contraceptive methods: Vasectomy   Desires to: do not start contraception  Last Pap : 2021 ASCUS HPV negative  Last Completed Pap Smear          Ordered - PAP SMEAR (Every 3 Years) Ordered on 2021  Pap IG, HPV-hr              History of abnormal Pap smear: yes - ascus hpv neg  Last mammogram:   Last Completed Mammogram     This patient has no relevant Health Maintenance data.        Tobacco Usage?: No   OB History        2    Para   2    Term   2            AB        Living   2       SAB        IAB        Ectopic        Molar        Multiple        Live Births   2                Health Maintenance   Topic Date Due   • Annual Gynecologic Pelvic and Breast Exam  Never done   • TDAP/TD VACCINES (1 - Tdap) Never done   • HEPATITIS C SCREENING  Never done   • COVID-19 Vaccine (2 - Pfizer 3-dose series) 2021   • ANNUAL  "PHYSICAL  02/02/2022   • INFLUENZA VACCINE  08/01/2022   • PAP SMEAR  02/03/2024   • Pneumococcal Vaccine 0-64  Aged Out       The additional following portions of the patient's history were reviewed and updated as appropriate: allergies, current medications, past family history, past medical history, past social history and past surgical history.    Review of Systems   Constitutional: Negative.    Cardiovascular: Negative.    Genitourinary: Positive for dysuria ( over the weekend but better now) and pelvic pain ( mild suprapubic discomfort).   Psychiatric/Behavioral: Negative.        I have reviewed and agree with the HPI, ROS, and historical information as entered above. Daphnie Virk, APRN    Objective   /76   Ht 167.6 cm (66\")   Wt 60.1 kg (132 lb 6.4 oz)   LMP 04/06/2022   Breastfeeding No   BMI 21.37 kg/m²     Physical Exam  Vitals and nursing note reviewed. Exam conducted with a chaperone present.   Constitutional:       Appearance: Normal appearance. She is well-developed and normal weight.   HENT:      Head: Normocephalic and atraumatic.   Neck:      Thyroid: No thyroid mass or thyromegaly.   Cardiovascular:      Rate and Rhythm: Normal rate and regular rhythm.      Heart sounds: No murmur heard.  Pulmonary:      Effort: Pulmonary effort is normal. No retractions.      Breath sounds: Normal breath sounds. No wheezing, rhonchi or rales.   Chest:      Chest wall: No mass or tenderness.   Breasts:      Right: Normal. No mass, nipple discharge, skin change or tenderness.      Left: Normal. No mass, nipple discharge, skin change or tenderness.       Abdominal:      Palpations: Abdomen is soft. Abdomen is not rigid. There is no mass.      Tenderness: There is no abdominal tenderness. There is no guarding.      Hernia: No hernia is present. There is no hernia in the left inguinal area.   Genitourinary:     General: Normal vulva.      Labia:         Right: No rash, tenderness or lesion.         Left: No " rash, tenderness or lesion.       Vagina: Normal. No foreign body. No vaginal discharge or lesions.      Cervix: Normal.      Uterus: Normal. Not enlarged, not fixed and not tender.       Adnexa: Right adnexa normal and left adnexa normal.        Right: No mass or tenderness.          Left: No mass or tenderness.        Rectum: Normal. No external hemorrhoid.      Comments: No evidence of retained tampon  Musculoskeletal:      Cervical back: Normal range of motion. No muscular tenderness.   Neurological:      Mental Status: She is alert and oriented to person, place, and time.   Psychiatric:         Behavior: Behavior normal.         Assessment/Plan     Assessment     Problem List Items Addressed This Visit    None     Visit Diagnoses     Well woman exam with routine gynecological exam    -  Primary    Relevant Orders    Pap IG, Rfx HPV ASCU    Burning with urination        Relevant Orders    POC Urinalysis Dipstick (Completed)            Plan     1. CCUA today normal. Increase water intake. Call if symptoms return or increase.  2. Monthly breast exams reviewed with pt.  3. Normal GYN exam today.   4.  had vasectomy for contraception.  5. RTC 1 year.        EDMUNDO Johnson  04/19/2022

## 2022-04-25 DIAGNOSIS — Z01.419 WELL WOMAN EXAM WITH ROUTINE GYNECOLOGICAL EXAM: ICD-10-CM

## 2022-05-12 ENCOUNTER — TELEPHONE (OUTPATIENT)
Dept: OBSTETRICS AND GYNECOLOGY | Facility: CLINIC | Age: 32
End: 2022-05-12

## 2022-05-12 NOTE — TELEPHONE ENCOUNTER
"Pt called in stating that she is on day 4 of her period and that she is having a hard time \"getting her breath\". States that this happens and she is not exerting herself. Pt states that she has had a terrible migraine for 3 days and has been in the bed. Also states that she is having cramps and lightheadedness. Pt denies any chest pain. Pt was instructed to call her PCP and to go directly to the ER if she began to have any chest pain. Pt states that she does not have a PCP so she was instructed to go to Urgent Care. Pt stated understanding to all discussed.  "

## 2022-07-15 ENCOUNTER — OFFICE VISIT (OUTPATIENT)
Dept: OBSTETRICS AND GYNECOLOGY | Facility: CLINIC | Age: 32
End: 2022-07-15

## 2022-07-15 VITALS
HEIGHT: 65 IN | WEIGHT: 122.6 LBS | DIASTOLIC BLOOD PRESSURE: 82 MMHG | BODY MASS INDEX: 20.43 KG/M2 | SYSTOLIC BLOOD PRESSURE: 118 MMHG

## 2022-07-15 DIAGNOSIS — N30.00 ACUTE CYSTITIS WITHOUT HEMATURIA: ICD-10-CM

## 2022-07-15 DIAGNOSIS — R30.0 DYSURIA: Primary | ICD-10-CM

## 2022-07-15 LAB
BILIRUB BLD-MCNC: ABNORMAL MG/DL
CLARITY, POC: CLEAR
COLOR UR: YELLOW
GLUCOSE UR STRIP-MCNC: NEGATIVE MG/DL
KETONES UR QL: NEGATIVE
LEUKOCYTE EST, POC: ABNORMAL
NITRITE UR-MCNC: POSITIVE MG/ML
PH UR: 7 [PH] (ref 5–8)
PROT UR STRIP-MCNC: ABNORMAL MG/DL
RBC # UR STRIP: NEGATIVE /UL
SP GR UR: 1.02 (ref 1–1.03)
UROBILINOGEN UR QL: NORMAL

## 2022-07-15 PROCEDURE — 99213 OFFICE O/P EST LOW 20 MIN: CPT | Performed by: OBSTETRICS & GYNECOLOGY

## 2022-07-15 PROCEDURE — 81002 URINALYSIS NONAUTO W/O SCOPE: CPT | Performed by: OBSTETRICS & GYNECOLOGY

## 2022-07-15 RX ORDER — SULFAMETHOXAZOLE AND TRIMETHOPRIM 800; 160 MG/1; MG/1
1 TABLET ORAL 2 TIMES DAILY
Qty: 6 TABLET | Refills: 0 | Status: SHIPPED | OUTPATIENT
Start: 2022-07-15

## 2022-07-15 NOTE — PROGRESS NOTES
"            Chief Complaint   Patient presents with   • Follow-up       Subjective   HPI  Chrystal Lawson is a 32 y.o. female, . Her last LMP was Patient's last menstrual period was 2022. Who presents for follow up on dysuria and urinary frequency.      Patient states that dysuria and urinary frequency have been present for 5 days. Patient has tried Azo OTC with no relief in s/s. The patient reports additional symptoms as none.      Additional OB/GYN History     Last Pap : 2022- negative, no HPV testing  Last Completed Pap Smear          PAP SMEAR (Every 3 Years) Next due on 2022  Pap IG, Rfx HPV ASCU    2021  Pap IG, HPV-hr              Tobacco Usage?: No   OB History        2    Para   2    Term   2            AB        Living   2       SAB        IAB        Ectopic        Molar        Multiple        Live Births   2                  Current Outpatient Medications:   •  Phenazopyridine HCl (AZO TABS PO), Take  by mouth., Disp: , Rfl:      Past Medical History:   Diagnosis Date   • Migraine         Past Surgical History:   Procedure Laterality Date   • WISDOM TOOTH EXTRACTION         The additional following portions of the patient's history were reviewed and updated as appropriate: allergies and current medications.    Review of Systems   Constitutional: Negative.    HENT: Negative.    Eyes: Negative.    Respiratory: Negative.    Cardiovascular: Negative.    Gastrointestinal: Negative.    Endocrine: Negative.    Genitourinary: Positive for dysuria and urgency.   Musculoskeletal: Negative.    Skin: Negative.    Allergic/Immunologic: Negative.    Neurological: Negative.    Hematological: Negative.    Psychiatric/Behavioral: Negative.        I have reviewed and agree with the HPI, ROS, and historical information as entered above. Christine Garner MD    Objective   /82   Ht 165.1 cm (65\")   Wt 55.6 kg (122 lb 9.6 oz)   LMP 2022   BMI 20.40 " kg/m²     Physical Exam  Constitutional:       Appearance: She is well-developed.   HENT:      Head: Normocephalic.   Eyes:      Conjunctiva/sclera: Conjunctivae normal.   Pulmonary:      Effort: Pulmonary effort is normal.   Psychiatric:         Behavior: Behavior normal.         Assessment & Plan     Assessment     Problem List Items Addressed This Visit    None     Visit Diagnoses     Dysuria    -  Primary    Relevant Orders    POC Urinalysis Dipstick (Completed)    Urine Culture - Urine, Urine, Clean Catch    Acute cystitis without hematuria        Relevant Medications    Phenazopyridine HCl (AZO TABS PO)              Plan     1. U/A c/w UTI.  Urine sent for culture.  She denies any abnormal vaginal d/c or risk for STD.  Call if no relief.    Christine Garner MD  07/15/2022

## 2022-07-17 LAB
BACTERIA UR CULT: NO GROWTH
BACTERIA UR CULT: NORMAL

## 2024-04-26 ENCOUNTER — OFFICE VISIT (OUTPATIENT)
Dept: OBSTETRICS AND GYNECOLOGY | Facility: CLINIC | Age: 34
End: 2024-04-26

## 2024-04-26 VITALS
WEIGHT: 115 LBS | RESPIRATION RATE: 18 BRPM | DIASTOLIC BLOOD PRESSURE: 78 MMHG | SYSTOLIC BLOOD PRESSURE: 110 MMHG | BODY MASS INDEX: 19.16 KG/M2 | HEIGHT: 65 IN

## 2024-04-26 DIAGNOSIS — L68.0 HIRSUTISM: ICD-10-CM

## 2024-04-26 DIAGNOSIS — Z01.419 WOMEN'S ANNUAL ROUTINE GYNECOLOGICAL EXAMINATION: Primary | ICD-10-CM

## 2024-04-26 NOTE — PROGRESS NOTES
Gynecologic Annual Exam Note        Gynecologic Exam        Subjective     HPI  Chrystal Lawson is a 34 y.o.  female who presents for annual well woman exam as a established patient of practice who is new to me. There were no changes to her medical or surgical history since her last visit.. Patient's last menstrual period was 2024 (within days). Her periods occur every 28-35 days, lasting 5 days.  The flow is heavy. Patient changes her tampon every time she uses the restroom. She does not keep track of time between changing them. She reports dysmenorrhea is moderate occurring first 1-2 days of flow. Marital Status: .  She is sexually active. She has not had new partners.. STD testing recommendations have been explained to the patient and she does not desire STD testing.    The patient would like to discuss the following complaints today: Irregular cycles (they are 25-35 days but vary monthly) and painful periods. Patient states she has also been experiencing hair growth on her chin that she believes is connected to PCOS.    Additional OB/GYN History   contraceptive methods: Vasectomy   Desires to: do not start contraception  Thromboembolic Disease: none  History of migraines: yes without aura  Age of menarche: 12    History of STD: no    Last Pap : 2022. Results: negative. HPV: not done. Her last HPV testing was  and was negative..   Last Completed Pap Smear            Ordered - PAP SMEAR (Every 3 Years) Ordered on 2022  Pap IG, Rfx HPV ASCU    2021  Pap IG, HPV-hr                     History of abnormal Pap smear: yes - ASC   Gardasil status:declines  Family history of uterine, colon, breast, or ovarian cancer: yes - PGM had breast cancer  Performs monthly Self-Breast Exam: no  Exercises Regularly:yes  Feelings of Anxiety or Depression: no  Tobacco Usage?: No     No current outpatient medications on file.     Patient denies the need for medication  "refills today.    OB History          2    Para   2    Term   2            AB        Living   2         SAB        IAB        Ectopic        Molar        Multiple        Live Births   2                Health Maintenance   Topic Date Due    TDAP/TD VACCINES (1 - Tdap) Never done    HEPATITIS C SCREENING  Never done    ANNUAL PHYSICAL  2022    Annual Gynecologic Pelvic and Breast Exam  2023    COVID-19 Vaccine (2 - - season) 2023    INFLUENZA VACCINE  2024    PAP SMEAR  2025    Pneumococcal Vaccine 0-64  Aged Out       Past Medical History:   Diagnosis Date    Migraine         Past Surgical History:   Procedure Laterality Date    WISDOM TOOTH EXTRACTION         The additional following portions of the patient's history were reviewed and updated as appropriate: allergies, current medications, past family history, past medical history, past social history, past surgical history, and problem list.    Review of Systems   Constitutional: Negative.    Respiratory: Negative.     Cardiovascular: Negative.    Gastrointestinal: Negative.    Genitourinary:         Abnormal hair growth         I have reviewed and agree with the HPI, ROS, and historical information as entered above. Jenny Denney, APRN            Objective   /78   Resp 18   Ht 165.1 cm (65\")   Wt 52.2 kg (115 lb)   LMP 2024 (Within Days)   BMI 19.14 kg/m²     Physical Exam  Constitutional:       Appearance: Normal appearance.   Neck:      Thyroid: No thyroid mass or thyromegaly.   Pulmonary:      Effort: Pulmonary effort is normal.   Chest:      Chest wall: No mass.   Breasts:     Right: Normal. No inverted nipple, mass, nipple discharge or skin change.      Left: Normal. No inverted nipple, mass, nipple discharge or skin change.   Abdominal:      General: There is no distension.      Palpations: Abdomen is soft. There is no mass.      Tenderness: There is no abdominal tenderness.      Hernia: " No hernia is present.   Genitourinary:     General: Normal vulva.      Labia:         Right: No rash.         Left: No rash.       Vagina: Normal.      Cervix: No cervical motion tenderness or lesion.      Uterus: Normal.       Adnexa: Right adnexa normal and left adnexa normal.        Right: No mass or tenderness.          Left: No mass or tenderness.     Neurological:      Mental Status: She is alert.            Assessment and Plan    Problem List Items Addressed This Visit          Skin    Hirsutism    Overview     Since having her last child. She has to pluck her chin every other day. Also has a few stray on her chest. Labs today. Offered spironolactone, she will consider. She has a derm appt scheduled as well. Declines contraception         Relevant Orders    LIQUID-BASED PAP SMEAR WITH HPV GENOTYPING REGARDLESS OF INTERPRETATION ([facility]ITZA,COR,MAD)    Testosterone    CBC (No Diff)    Comprehensive Metabolic Panel    Hemoglobin A1c    TSH    T4, Free     Other Visit Diagnoses       Women's annual routine gynecological examination    -  Primary    Relevant Orders    LIQUID-BASED PAP SMEAR WITH HPV GENOTYPING REGARDLESS OF INTERPRETATION ([facility]ITZA,COR,MAD)    Testosterone    CBC (No Diff)    Comprehensive Metabolic Panel    Hemoglobin A1c    TSH    T4, Free          Non-fasting labs today    GYN annual well woman exam.   Reviewed pap guidelines.   Reviewed monthly self breast exams.  Instructed to call with lumps, pain, or breast discharge.    Return in about 1 year (around 4/26/2025) for Annual physical.  Call if desires spironolactone. F/U with dermatology    Jenny Denney, EDMUNDO  04/26/2024

## 2024-04-27 LAB
ALBUMIN SERPL-MCNC: 4.9 G/DL (ref 3.9–4.9)
ALBUMIN/GLOB SERPL: 2 {RATIO} (ref 1.2–2.2)
ALP SERPL-CCNC: 51 IU/L (ref 44–121)
ALT SERPL-CCNC: 10 IU/L (ref 0–32)
AST SERPL-CCNC: 12 IU/L (ref 0–40)
BILIRUB SERPL-MCNC: 2.2 MG/DL (ref 0–1.2)
BUN SERPL-MCNC: 5 MG/DL (ref 6–20)
BUN/CREAT SERPL: 6 (ref 9–23)
CALCIUM SERPL-MCNC: 9.9 MG/DL (ref 8.7–10.2)
CHLORIDE SERPL-SCNC: 102 MMOL/L (ref 96–106)
CO2 SERPL-SCNC: 25 MMOL/L (ref 20–29)
CREAT SERPL-MCNC: 0.81 MG/DL (ref 0.57–1)
EGFRCR SERPLBLD CKD-EPI 2021: 98 ML/MIN/1.73
ERYTHROCYTE [DISTWIDTH] IN BLOOD BY AUTOMATED COUNT: 11.8 % (ref 11.7–15.4)
GLOBULIN SER CALC-MCNC: 2.4 G/DL (ref 1.5–4.5)
GLUCOSE SERPL-MCNC: 72 MG/DL (ref 70–99)
HBA1C MFR BLD: 5 % (ref 4.8–5.6)
HCT VFR BLD AUTO: 41.7 % (ref 34–46.6)
HGB BLD-MCNC: 14.3 G/DL (ref 11.1–15.9)
MCH RBC QN AUTO: 32.7 PG (ref 26.6–33)
MCHC RBC AUTO-ENTMCNC: 34.3 G/DL (ref 31.5–35.7)
MCV RBC AUTO: 95 FL (ref 79–97)
PLATELET # BLD AUTO: 358 X10E3/UL (ref 150–450)
POTASSIUM SERPL-SCNC: 4.1 MMOL/L (ref 3.5–5.2)
PROT SERPL-MCNC: 7.3 G/DL (ref 6–8.5)
RBC # BLD AUTO: 4.37 X10E6/UL (ref 3.77–5.28)
SODIUM SERPL-SCNC: 141 MMOL/L (ref 134–144)
T4 FREE SERPL-MCNC: 1.24 NG/DL (ref 0.82–1.77)
TESTOST SERPL-MCNC: 28 NG/DL (ref 8–60)
TSH SERPL DL<=0.005 MIU/L-ACNC: 1.37 UIU/ML (ref 0.45–4.5)
WBC # BLD AUTO: 5.3 X10E3/UL (ref 3.4–10.8)

## 2024-05-02 LAB — REF LAB TEST METHOD: NORMAL

## 2024-05-08 ENCOUNTER — TELEPHONE (OUTPATIENT)
Dept: OBSTETRICS AND GYNECOLOGY | Facility: CLINIC | Age: 34
End: 2024-05-08
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
